# Patient Record
Sex: FEMALE
[De-identification: names, ages, dates, MRNs, and addresses within clinical notes are randomized per-mention and may not be internally consistent; named-entity substitution may affect disease eponyms.]

---

## 2017-04-15 ENCOUNTER — HOSPITAL ENCOUNTER (EMERGENCY)
Dept: HOSPITAL 14 - H.ER | Age: 22
LOS: 1 days | Discharge: HOME | End: 2017-04-16
Payer: COMMERCIAL

## 2017-04-15 VITALS
DIASTOLIC BLOOD PRESSURE: 63 MMHG | RESPIRATION RATE: 15 BRPM | HEART RATE: 66 BPM | SYSTOLIC BLOOD PRESSURE: 122 MMHG | OXYGEN SATURATION: 99 % | TEMPERATURE: 98.1 F
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
## 2017-04-15 DIAGNOSIS — S89.91XA: Primary | ICD-10-CM

## 2017-04-16 NOTE — RAD
PROCEDURE:  Right Knee Radiographs.



HISTORY:



COMPARISON:

None available.



FINDINGS:



BONES:

No acute displaced fracture. 



JOINTS:

No dislocation. 



JOINT EFFUSION:

Probable small suprapatellar joint effusion. 



OTHER FINDINGS:

None.



IMPRESSION:

Probable small suprapatellar joint effusion. 



No acute displaced fracture or dislocation identified. 



If symptoms persist, or if there is continued clinical concern, x-ray 

follow-up in 7-10 days should be considered.

## 2017-04-16 NOTE — ED PDOC
Lower Extremity Pain/Injury


Time Seen by Provider: 04/15/17 23:14


Chief Complaint (Nursing): Lower Extremity Problem/Injury


Chief Complaint (Provider): Right knee pain, 2 hours PTA


History Per: Patient


History/Exam Limitations: no limitations


Onset/Duration Of Symptoms: Days


Current Symptoms Are (Timing): Still Present


Additional Complaint(s): 


Pt states she was jumping on a trampolene PTA and twisted her right knee. PT 

states she heard a crack. Pt states she is now having pain in the back of the 

knee. Non-radiating. no medications for pain PTa. No knee injuries in the past.





Past Medical History


Vital Signs: 





 Last Vital Signs











Temp  98.1 F   04/15/17 23:00


 


Pulse  66   04/15/17 23:00


 


Resp  15   04/15/17 23:00


 


BP  122/63   04/15/17 23:00


 


Pulse Ox  99   04/15/17 23:00














- Medical History


PMH: 


   Denies: Chronic Kidney Disease





- Family History


Family History: States: Unknown Family Hx





- Home Medications


Home Medications: 


 Ambulatory Orders











 Medication  Instructions  Recorded


 


Amoxicillin/Clavulanate [Augmentin 1 tab PO BID #14 tab 03/06/17





875 MG-125 MG]  


 


Methylprednisolone [Medrol Dose 4 mg PO DAILY #21 mg 03/06/17





Pack (21 tabs)]  














- Allergies


Allergies/Adverse Reactions: 


 Allergies











Allergy/AdvReac Type Severity Reaction Status Date / Time


 


No Known Allergies Allergy   Verified 04/15/17 23:02














Physical Exam





- Reviewed


Nursing Documentation Reviewed: Yes


Vital Signs Reviewed: Yes





- Physical Exam


Appears: Positive for: Well, Non-toxic, No Acute Distress


Head Exam: Positive for: ATRAUMATIC, NORMAL INSPECTION, NORMOCEPHALIC


Skin: Positive for: Normal Color, Warm, DRY


Eye Exam: Positive for: Normal appearance


ENT: Positive for: Normal ENT Inspection


Neck: Positive for: Normal, Painless ROM


Respiratory: Negative for: Accessory Muscle Use, Respiratory Distress


Pulses-Dorsalis Pedis (L): 2+


Pulses-Dorsalis Pedis (R): 2+


Pulses-Post. Tibialis (L): 2+


Pulses-Post. Tibialis (R): 2+


Back: Positive for: Normal Inspection


Extremity: Positive for: Normal ROM, Tenderness (Posterior knee )


Neurologic/Psych: Positive for: Alert, Oriented





- ECG


O2 Sat by Pulse Oximetry: 99


Pulse Ox Interpretation: Normal





Disposition





- Clinical Impression


Clinical Impression: 


 Knee injury





- Patient ED Disposition


Is Patient to be Admitted: No


Counseled Patient/Family Regarding: Diagnosis, Need For Followup





- Disposition


Referrals: 


Brianna Sandoval MD [Staff Provider] - 


Disposition: Routine/Home


Disposition Time: 01:48


Condition: GOOD


Additional Instructions: 


Ice, elevation, motrin. 


Follow-up with orthopedics if pain persists. 


Instructions:  Swollen Knee Joint (ED)

## 2022-06-12 ENCOUNTER — HOSPITAL ENCOUNTER (EMERGENCY)
Dept: HOSPITAL 93 - ER | Age: 27
Discharge: HOME | End: 2022-06-12
Payer: COMMERCIAL

## 2022-06-12 VITALS — WEIGHT: 155 LBS | HEIGHT: 64 IN | BODY MASS INDEX: 26.46 KG/M2
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
## 2022-06-12 DIAGNOSIS — S80.02XA: Primary | ICD-10-CM
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
